# Patient Record
Sex: FEMALE | Race: WHITE | Employment: FULL TIME | URBAN - METROPOLITAN AREA
[De-identification: names, ages, dates, MRNs, and addresses within clinical notes are randomized per-mention and may not be internally consistent; named-entity substitution may affect disease eponyms.]

---

## 2024-05-24 NOTE — PROGRESS NOTES
PATIENT REACHED   YES____NO_X___        PREOP INSTRUCTIONS LEFT ON VM NUMBER:       Date: 5/ 30/ 2024      Arrival Time: 1:15 PM      Procedure Time: 2:15 PM      Location: [address] [place] [address]. [place] [address]      Nothing to eat or drink after MIDNIGHT the evening before your procedure.    You will need a responsible adult 18 years or older to stay on site, and take you home after the procedure.    Please bring a complete list of medications and supplements you currently take, with dosing.    Wear loose comfortable clothes.    Please follow any and all instructions the office has given you regarding medications that need to be stopped prior to procedure.     Please verify with the office regarding blood thinners.    The goal of blood sugar is to be under >200, the day of the procedure. If it is elevated you may be cancelled.    ANY QUESTIONS CALL YOUR DOCTOR.ALSO,PLEASE READ THE INSTRUCTION PACKET FROM YOUR DR IF YOU RECEIVED ONE.      DR. GUARDADO' OFFICE: [phone]      Additional Information:      VISITOR POLICY(subject to change)    Current policy is 2 visitors per patient. No children under 18. Masks are optional.

## 2024-05-30 ENCOUNTER — APPOINTMENT (OUTPATIENT)
Dept: GENERAL RADIOLOGY | Age: 54
End: 2024-05-30
Attending: PHYSICAL MEDICINE & REHABILITATION
Payer: COMMERCIAL

## 2024-05-30 ENCOUNTER — HOSPITAL ENCOUNTER (OUTPATIENT)
Age: 54
Setting detail: OUTPATIENT SURGERY
Discharge: HOME | End: 2024-05-30
Attending: PHYSICAL MEDICINE & REHABILITATION | Admitting: PHYSICAL MEDICINE & REHABILITATION
Payer: COMMERCIAL

## 2024-05-30 VITALS
OXYGEN SATURATION: 100 % | HEART RATE: 83 BPM | TEMPERATURE: 97.7 F | DIASTOLIC BLOOD PRESSURE: 83 MMHG | WEIGHT: 145 LBS | HEIGHT: 64 IN | SYSTOLIC BLOOD PRESSURE: 136 MMHG | BODY MASS INDEX: 24.75 KG/M2 | RESPIRATION RATE: 14 BRPM

## 2024-05-30 PROCEDURE — 2709999900 HC NON-CHARGEABLE SUPPLY: Performed by: PHYSICAL MEDICINE & REHABILITATION

## 2024-05-30 PROCEDURE — 6360000002 HC RX W HCPCS: Performed by: PHYSICAL MEDICINE & REHABILITATION

## 2024-05-30 PROCEDURE — 77003 FLUOROGUIDE FOR SPINE INJECT: CPT

## 2024-05-30 PROCEDURE — 3610000055 HC PAIN LEVEL 3 ADDL 15 MIN (NON-OR): Performed by: PHYSICAL MEDICINE & REHABILITATION

## 2024-05-30 PROCEDURE — 3610000054 HC PAIN LEVEL 3 BASE (NON-OR): Performed by: PHYSICAL MEDICINE & REHABILITATION

## 2024-05-30 PROCEDURE — 2500000003 HC RX 250 WO HCPCS: Performed by: PHYSICAL MEDICINE & REHABILITATION

## 2024-05-30 RX ORDER — LEVOTHYROXINE SODIUM 88 UG/1
88 TABLET ORAL DAILY
COMMUNITY
Start: 2024-03-06

## 2024-05-30 RX ORDER — LIDOCAINE HYDROCHLORIDE 10 MG/ML
INJECTION, SOLUTION EPIDURAL; INFILTRATION; INTRACAUDAL; PERINEURAL
Status: COMPLETED | OUTPATIENT
Start: 2024-05-30 | End: 2024-05-30

## 2024-05-30 RX ORDER — SERTRALINE HYDROCHLORIDE 100 MG/1
150 TABLET, FILM COATED ORAL DAILY
COMMUNITY
Start: 2010-07-21 | End: 2024-09-02

## 2024-05-30 RX ORDER — DEXAMETHASONE SODIUM PHOSPHATE 10 MG/ML
INJECTION INTRAMUSCULAR; INTRAVENOUS
Status: COMPLETED | OUTPATIENT
Start: 2024-05-30 | End: 2024-05-30

## 2024-05-30 RX ORDER — CLONAZEPAM 0.5 MG/1
TABLET ORAL
COMMUNITY
Start: 2010-07-20

## 2024-05-30 ASSESSMENT — PAIN DESCRIPTION - DESCRIPTORS
DESCRIPTORS: PRESSURE
DESCRIPTORS: ACHING;SHARP

## 2024-05-30 ASSESSMENT — PAIN SCALES - GENERAL
PAINLEVEL_OUTOF10: 6
PAINLEVEL_OUTOF10: 5

## 2024-05-30 ASSESSMENT — PAIN DESCRIPTION - PAIN TYPE: TYPE: CHRONIC PAIN

## 2024-05-30 ASSESSMENT — PAIN DESCRIPTION - ORIENTATION
ORIENTATION: RIGHT
ORIENTATION: MID;RIGHT

## 2024-05-30 ASSESSMENT — PAIN DESCRIPTION - LOCATION
LOCATION: NECK;SHOULDER;ARM
LOCATION: NECK

## 2024-05-30 NOTE — H&P
HISTORY AND PHYSICAL/PRE-SEDATION ASSESSMENT    Patient:  Talita Rodriguez   :  1970  Medical Record No.:  [id number]   Date:  2024  Physician:  Jr Morrow MD  Facility: [facility]    HISTORY OF PRESENT ILLNESS:                 The patient is a 53 y.o. female whom presents with arm pain. Review of the imaging and physical exam of the patient confirmed the pre-procedure diagnosis.  After a thorough discussion of risks, benefits and alternatives informed consent was obtained.    Diagnosis:  Pre-Op Diagnosis Codes:     * Cervical radiculopathy [M54.12]    Past Medical History:   No past medical history on file.     Past Surgical History:     No past surgical history on file.    Current Medications:   Prior to Admission medications    Not on File       Allergies:  Erythromycin, Iodine, and Sulfa antibiotics    Social History:        Family History:   No family history on file.     Vitals: There were no vitals taken for this visit.    PHYSICAL EXAM:  HENT: Airway patent and reviewed  Cardiovascular: Normal rate, regular rhythm, normal heart sounds.   Pulmonary/Chest: No wheezes. No rhonchi. No rales.   Abdominal: Soft. Bowel sounds are normal. No distension.  Extremities: Moves all extremities equally  Lumbar Spine: Painful range of motion, no midline tenderness     ASA CLASS:         []   I. Normal, healthy adult           [x]   II.  Mild systemic disease            []   III.  Severe systemic disease    Mallampati: Mallampati Class II - (soft palate, fauces & uvula are visible)      Sedation plan:   []  Local              [x]  Minimal                  []  General anesthesia    Treatment plan:  Patient's condition acceptable for planned procedure/sedation.  Proceed with planned procedure   Post Procedure Plan   Return to same level of care   ______________________     The risks and benefits as well as alternatives to the procedure have been discussed with the patient and or

## 2024-05-30 NOTE — PROGRESS NOTES
IV discontinued, catheter intact, and dressing applied.    Procedural dressing dry and intact.    Bilateral upper, lower extremities equal in strength.    Discharge instructions reviewed with patient or responsible adult, signed and copy given.  All home medications have been reviewed.  All questions answered and patient or responsible adult verbalized understanding.

## 2024-05-30 NOTE — OP NOTE
PATIENT:  Talita Rodriguez  AGE:  53 yrs  MEDICAL RECORD #:  [id number]  YOB: 1970     DATE:  5/30/2024  PHYSICIAN: Jr Morrow M.D.     PROCEDURE: Right C6 transforaminal epidural steroid injection under fluoroscopy.     PRE-OP DIAGNOSIS:  Neck Pain/Radiculopathy     POST-OP DIAGNOSIS:  same     HISTORY OF PRESENT ILLNESS:  See office notes. Patient has failed previous less-invasive treatments.     ALLERGIES:  Erythromycin, Iodine, and Sulfa antibiotics     MEDICATIONS:    No current facility-administered medications for this encounter.        PHYSICAL EXAMINATION:              General:  Awake, alert              Heart:  No audible murmurs, extremities well perfused              Lungs:  No increased WOB or audible wheezing              Extremities:  Normal tone. Warm. No swelling.      Anesthesia: 0 mg Versed and 0 mcg fentanyl    Estimated blood loss: None  Complications: None  Specimen: None    DESCRIPTION OF PROCEDURE:     Components of the procedure were again reviewed with the patient prior to the procedure.  She is aware of risks including infection, bleeding, allergic reaction, and nerve injury.  She had ample opportunity for additional questions.  She elected to proceed with treatment.     The patient was placed in the supine position.  Cardiovascular monitoring was initiated, and vital signs were stable prior to, during, and after the procedure.  Utilizing fluoroscopy, the C6 vertebrae was identified.  The area was sterilely prepped and draped. Skin anesthesia was achieved at each entry site using 1-2 cc of Lidocaine 1%. A 25 g 2.0 inch spinal needle was slowly inserted into the right C6 neuroforamen using AP, lateral and oblique fluoroscopic imaging. Negative aspiration was confirmed. 1 cc PROHANCE was injected at each site showing contrast spread into the epidural space and along the nerve root without vascular uptake. One ml of 10 mg dexamethasone was slowly injected at each site. The

## 2024-06-19 NOTE — PROGRESS NOTES
PATIENT REACHED   YES____NO____    PREOP INSTRUCTIONS LEFT ON VM NUMBER___UNABLE TO LEAVE MESSAGE____________      SWDF___[id number]______ TIME__1000_______ARRIVAL__0900______PLACE__[address] [facility]__________  NOTHING TO EAT OR DRINK  AFTER MIDNIGHT THE EVENING PRIOR OR AS INSTRUCTED BY YOUR DR.  YOU NEED A RESPONSIBLE ADULT AGE 18 OR OLDER TO DRIVE YOU HOME  PLEASE BRING INSURANCE CARD.PICTURE ID AND COMPLETE LIST OF MEDS  WEAR LOOSE COMFORTABLE CLOTHING  FOLLOW ANY INSTRUCTIONS YOUR DRS OFFICE HAS GIVEN YOU,INCLUDING WHAT MEDICATIONS TO TAKE THE AM OF PROCEDURE AND WHEN AND IF YOU NEED TO STOP ANY BLOOD THINNERS. IF YOU HAVE QUESTIONS REGARDING THIS CALL THE OFFICE  THE GOAL BLOOD SUGAR THE AM OF PROCEDURE  OR LESS ABOVE THAT THE PROCEDURE MAY BE CANCELLED  ANY QUESTIONS CALL YOUR DOCTOR.ALSO,PLEASE READ THE INSTRUCTION PACKET FROM YOUR DR IF YOU RECEIVED ONE.  SPINE INTERVENTION NUMBER [phone]      OTHER___________________________________      VISITOR POLICY(subject to change)    Current policy is 2 visitors per patient. No children. Masks are required.

## 2024-06-27 ENCOUNTER — HOSPITAL ENCOUNTER (OUTPATIENT)
Age: 54
Setting detail: OUTPATIENT SURGERY
Discharge: HOME | End: 2024-06-27
Attending: PHYSICAL MEDICINE & REHABILITATION | Admitting: PHYSICAL MEDICINE & REHABILITATION
Payer: COMMERCIAL

## 2024-06-27 ENCOUNTER — APPOINTMENT (OUTPATIENT)
Dept: GENERAL RADIOLOGY | Age: 54
End: 2024-06-27
Attending: PHYSICAL MEDICINE & REHABILITATION
Payer: COMMERCIAL

## 2024-06-27 VITALS
DIASTOLIC BLOOD PRESSURE: 87 MMHG | TEMPERATURE: 97.7 F | OXYGEN SATURATION: 100 % | HEIGHT: 64 IN | RESPIRATION RATE: 15 BRPM | HEART RATE: 60 BPM | BODY MASS INDEX: 25.27 KG/M2 | SYSTOLIC BLOOD PRESSURE: 129 MMHG | WEIGHT: 148 LBS

## 2024-06-27 PROCEDURE — 6360000002 HC RX W HCPCS: Performed by: PHYSICAL MEDICINE & REHABILITATION

## 2024-06-27 PROCEDURE — 77003 FLUOROGUIDE FOR SPINE INJECT: CPT

## 2024-06-27 PROCEDURE — 3610000058 HC PAIN LEVEL 5 BASE (NON-OR): Performed by: PHYSICAL MEDICINE & REHABILITATION

## 2024-06-27 PROCEDURE — 2500000003 HC RX 250 WO HCPCS: Performed by: PHYSICAL MEDICINE & REHABILITATION

## 2024-06-27 PROCEDURE — 2709999900 HC NON-CHARGEABLE SUPPLY: Performed by: PHYSICAL MEDICINE & REHABILITATION

## 2024-06-27 RX ORDER — BUPIVACAINE HYDROCHLORIDE 5 MG/ML
INJECTION, SOLUTION EPIDURAL; INTRACAUDAL
Status: COMPLETED | OUTPATIENT
Start: 2024-06-27 | End: 2024-06-27

## 2024-06-27 RX ORDER — LIDOCAINE HYDROCHLORIDE 10 MG/ML
INJECTION, SOLUTION EPIDURAL; INFILTRATION; INTRACAUDAL; PERINEURAL
Status: COMPLETED | OUTPATIENT
Start: 2024-06-27 | End: 2024-06-27

## 2024-06-27 ASSESSMENT — PAIN - FUNCTIONAL ASSESSMENT
PAIN_FUNCTIONAL_ASSESSMENT: 0-10
PAIN_FUNCTIONAL_ASSESSMENT: 0-10

## 2024-06-27 NOTE — OP NOTE
PATIENT:  Talita Rodriguez  AGE:  53 yrs  MEDICAL RECORD #:  [id number]  YOB: 1970     DATE:  6/27/2024  PHYSICIAN: Jr Morrow M.D.     PROCEDURE: Bilateral L3, L4, L5 diagnostic medial branch blocks under fluoroscopy.     PRE-OP DIAGNOSIS:  Low Back Pain/ Lumbar Spondylosis     POST-OP DIAGNOSIS:  same     HISTORY OF PRESENT ILLNESS:  See office notes. Patient has failed previous less-invasive treatments.     ALLERGIES:  Erythromycin, Iodine, and Sulfa antibiotics     MEDICATIONS:    No current facility-administered medications for this encounter.        PHYSICAL EXAMINATION:              General:  Awake, alert              Heart:  No audible murmurs, extremities well perfused              Lungs:  No increased WOB or audible wheezing              Extremities:  Normal tone.  No swelling.      Anesthesia: None    Estimated blood loss: None  Complications: None  Specimen: None    DESCRIPTION OF PROCEDURE:     Components of the procedure were again reviewed with the patient prior to the procedure.  She is aware of risks including infection, bleeding, allergic reaction, and nerve injury.  She had ample opportunity for additional questions.  She elected to proceed with treatment.     The patient was placed in the prone position.  Utilizing fluoroscopy, the L3, L4, and L5 areas were identified, target points for the diagnostic medial branch blocks were at the roots of the transverse processes and the superior articular processes with the exception of the L5 dorsal ramus at the sacral ala.  Appropriate entry sites were selected over the skin.  The skin was prepared in sterile fashion.  Local anesthesia was carried out at each of the 4 entry sites with 1-2 ml lidocaine 1%.     Utilizing a 22 gauge needle x 3.5 inch under fluoroscopy, each of the  6  target sites were approached.  AP and oblique viewpoints were utilized to ensure appropriate needle localization. Each of the 6 diagnostic blocks were carried

## 2024-06-27 NOTE — PROGRESS NOTES
Procedural dressing dry and intact.    Bilateral lower extremities equal in strength.    Discharge instructions reviewed with patient , signed and copy given.  All home medications have been reviewed.  All questions answered and patient  verbalized understanding.    CARLOS EDUARDO Cervantes,    Call from [facility] asking for updated blood pressure for patient? Last noted was 140/90 per caller. Or if any medication changes have been made.   She is asking for a callback at   [phone]    Thanks, Bismark Alexander

## 2024-06-27 NOTE — H&P
HISTORY AND PHYSICAL/PRE-SEDATION ASSESSMENT    Patient:  Talita Rodriguez   :  1970  Medical Record No.:  [id number]   Date:  2024  Physician:  Jr Morrow MD  Facility: [facility]    HISTORY OF PRESENT ILLNESS:                 The patient is a 53 y.o. female whom presents with low back pain. Review of the imaging and physical exam of the patient confirmed the pre-procedure diagnosis.  After a thorough discussion of risks, benefits and alternatives informed consent was obtained.    Diagnosis:  Pre-Op Diagnosis Codes:     * Lumbar spondylosis [M47.816]    Past Medical History:   Past Medical History:   Diagnosis Date    Thyroid disease         Past Surgical History:     Past Surgical History:   Procedure Laterality Date    BREAST SURGERY Right      SECTION      ELBOW SURGERY Left     Torn elbow tendon surgery    PAIN MANAGEMENT PROCEDURE Right 2024    RIGHT C5-6 TRANSFORAMINAL EPIDURAL STEROID INJECTION WITH FLUOROSCOPY [facility] performed by Jr Morrow MD at [facility]       Current Medications:   Prior to Admission medications    Medication Sig Start Date End Date Taking? Authorizing Provider   levothyroxine (SYNTHROID) 88 MCG tablet Take 1 tablet by mouth daily 3/6/24   Bhavin Mace MD   sertraline (ZOLOFT) 100 MG tablet Take 1.5 tablets by mouth daily 7/21/10 9/2/24  Bhavin Mace MD   clonazePAM (KLONOPIN) 0.5 MG tablet CLONAZEPAM 0.5 MG TABS 7/20/10   Bhavin Mace MD       Allergies:  Erythromycin, Iodine, and Sulfa antibiotics    Social History:    reports that she has never smoked. She does not have any smokeless tobacco history on file. She reports that she does not drink alcohol and does not use drugs.    Family History:   Family History   Problem Relation Age of Onset    No Known Problems Mother     No Known Problems Father         Vitals: not currently breastfeeding.    PHYSICAL EXAM:  HENT: Airway patent and

## 2024-07-26 NOTE — PROGRESS NOTES
PATIENT REACHED   YES____NO____        PREOP INSTRUCTIONS LEFT ON VM NUMBER:       Date: 8/ 1 /2024      Arrival Time: 11:00 AM      Procedure Time: 12:00 PM      Location: [address] [place] [address]. [place] [address]      Nothing to eat or drink 6 Hours prior to arrival.    You will need a responsible adult 18 years or older, to stay on site and take you home after the procedure.   (PLEASE HAVE  ACCOMPANY YOU INSIDE FOR REGISTRATION)    Please bring a complete list of medications and supplements you currently take, with name and dosing.    Wear loose comfortable clothes.    Please follow any and all instructions the office has given you regarding medications that need to be stopped prior to procedure.     Please verify with the office regarding blood thinners.    The goal of blood sugar is to be under >200, the day of the procedure. If it is elevated you may be cancelled.    ANY QUESTIONS CALL YOUR DOCTOR.ALSO,PLEASE READ THE INSTRUCTION PACKET FROM YOUR DR IF YOU RECEIVED ONE.      DR. GUARDADO' OFFICE: [phone]      Additional Information:      VISITOR POLICY(subject to change)    Current policy is 2 visitors per patient. No children under 18. Masks are optional.

## 2024-08-01 ENCOUNTER — APPOINTMENT (OUTPATIENT)
Dept: GENERAL RADIOLOGY | Age: 54
End: 2024-08-01
Attending: PHYSICAL MEDICINE & REHABILITATION
Payer: COMMERCIAL

## 2024-08-01 ENCOUNTER — HOSPITAL ENCOUNTER (OUTPATIENT)
Age: 54
Setting detail: OUTPATIENT SURGERY
Discharge: HOME | End: 2024-08-01
Attending: PHYSICAL MEDICINE & REHABILITATION | Admitting: PHYSICAL MEDICINE & REHABILITATION
Payer: COMMERCIAL

## 2024-08-01 VITALS
DIASTOLIC BLOOD PRESSURE: 84 MMHG | WEIGHT: 147 LBS | SYSTOLIC BLOOD PRESSURE: 137 MMHG | OXYGEN SATURATION: 100 % | TEMPERATURE: 98.2 F | RESPIRATION RATE: 16 BRPM | BODY MASS INDEX: 25.1 KG/M2 | HEART RATE: 63 BPM | HEIGHT: 64 IN

## 2024-08-01 PROCEDURE — 6360000002 HC RX W HCPCS: Performed by: PHYSICAL MEDICINE & REHABILITATION

## 2024-08-01 PROCEDURE — 77003 FLUOROGUIDE FOR SPINE INJECT: CPT

## 2024-08-01 PROCEDURE — 2500000003 HC RX 250 WO HCPCS: Performed by: PHYSICAL MEDICINE & REHABILITATION

## 2024-08-01 PROCEDURE — 2709999900 HC NON-CHARGEABLE SUPPLY: Performed by: PHYSICAL MEDICINE & REHABILITATION

## 2024-08-01 PROCEDURE — 3610000058 HC PAIN LEVEL 5 BASE (NON-OR): Performed by: PHYSICAL MEDICINE & REHABILITATION

## 2024-08-01 RX ORDER — BUPIVACAINE HYDROCHLORIDE 5 MG/ML
INJECTION, SOLUTION EPIDURAL; INTRACAUDAL
Status: COMPLETED | OUTPATIENT
Start: 2024-08-01 | End: 2024-08-01

## 2024-08-01 RX ORDER — IBUPROFEN 200 MG
200 TABLET ORAL EVERY 6 HOURS PRN
COMMUNITY

## 2024-08-01 RX ORDER — LIDOCAINE HYDROCHLORIDE 10 MG/ML
INJECTION, SOLUTION EPIDURAL; INFILTRATION; INTRACAUDAL; PERINEURAL
Status: COMPLETED | OUTPATIENT
Start: 2024-08-01 | End: 2024-08-01

## 2024-08-01 ASSESSMENT — PAIN - FUNCTIONAL ASSESSMENT
PAIN_FUNCTIONAL_ASSESSMENT: ACTIVITIES ARE NOT PREVENTED
PAIN_FUNCTIONAL_ASSESSMENT: 0-10
PAIN_FUNCTIONAL_ASSESSMENT: 0-10
PAIN_FUNCTIONAL_ASSESSMENT: PREVENTS OR INTERFERES SOME ACTIVE ACTIVITIES AND ADLS

## 2024-08-01 ASSESSMENT — PAIN DESCRIPTION - DESCRIPTORS
DESCRIPTORS: ACHING
DESCRIPTORS: DISCOMFORT

## 2024-08-01 NOTE — OP NOTE
PATIENT:  Talita Rodriguez  AGE:  54 yrs  MEDICAL RECORD #:  [id number]  YOB: 1970     DATE:  8/1/2024  PHYSICIAN: Jr Morrow M.D.     PROCEDURE: Bilateral L3, L4, L5 diagnostic medial branch blocks under fluoroscopy.     PRE-OP DIAGNOSIS:  Low Back Pain/ Lumbar Spondylosis     POST-OP DIAGNOSIS:  same     HISTORY OF PRESENT ILLNESS:  See office notes. Patient has failed previous less-invasive treatments.     ALLERGIES:  Erythromycin, Iodine, and Sulfa antibiotics     MEDICATIONS:    No current facility-administered medications for this encounter.        PHYSICAL EXAMINATION:              General:  Awake, alert              Heart:  No audible murmurs, extremities well perfused              Lungs:  No increased WOB or audible wheezing              Extremities:  Normal tone.  No swelling.      Anesthesia: None    Estimated blood loss: None  Complications: None  Specimen: None    DESCRIPTION OF PROCEDURE:     Components of the procedure were again reviewed with the patient prior to the procedure.  She is aware of risks including infection, bleeding, allergic reaction, and nerve injury.  She had ample opportunity for additional questions.  She elected to proceed with treatment.     The patient was placed in the prone position.  Utilizing fluoroscopy, the L3, L4, and L5 areas were identified, target points for the diagnostic medial branch blocks were at the roots of the transverse processes and the superior articular processes with the exception of the L5 dorsal ramus at the sacral ala.  Appropriate entry sites were selected over the skin.  The skin was prepared in sterile fashion.  Local anesthesia was carried out at each of the 4 entry sites with 1-2 ml lidocaine 1%.     Utilizing a 22 gauge needle x 3.5 inch under fluoroscopy, each of the  6  target sites were approached.  AP and oblique viewpoints were utilized to ensure appropriate needle localization. Each of the 6 diagnostic blocks were carried

## 2024-08-01 NOTE — PROGRESS NOTES
Procedural dressing dry and intact.    Bilateral lower extremities equal in strength.    Discharge instructions reviewed with patient or responsible adult, signed and copy given.  All home medications have been reviewed.  All questions answered and patient or responsible adult verbalized understanding.

## 2024-08-01 NOTE — H&P
HISTORY AND PHYSICAL/PRE-SEDATION ASSESSMENT    Patient:  Talita Rodriguez   :  1970  Medical Record No.:  [id number]   Date:  2024  Physician:  Jr Morrow MD  Facility: [facility]    HISTORY OF PRESENT ILLNESS:                 The patient is a 54 y.o. female whom presents with low back pain. Review of the imaging and physical exam of the patient confirmed the pre-procedure diagnosis.  After a thorough discussion of risks, benefits and alternatives informed consent was obtained.    Diagnosis:  Pre-Op Diagnosis Codes:     * Lumbar spondylosis [M47.816]    Past Medical History:   Past Medical History:   Diagnosis Date    Thyroid disease         Past Surgical History:     Past Surgical History:   Procedure Laterality Date    BREAST SURGERY Right      SECTION      ELBOW SURGERY Left     Torn elbow tendon surgery    PAIN MANAGEMENT PROCEDURE Right 2024    RIGHT C5-6 TRANSFORAMINAL EPIDURAL STEROID INJECTION WITH FLUOROSCOPY - [facility] performed by Jr Morrow MD at [facility]    PAIN MANAGEMENT PROCEDURE Bilateral 2024    BILATERAL L3, L4, L5 MEDIAL BRANCH BLOCK WITH FLUOROSCOPY #1 - [facility] performed by Jr Morrow MD at [facility]       Current Medications:   Prior to Admission medications    Medication Sig Start Date End Date Taking? Authorizing Provider   ibuprofen (ADVIL;MOTRIN) 200 MG tablet Take 1 tablet by mouth every 6 hours as needed for Pain   Yes Provider, Historical, MD   TURMERIC PO Take 1 capsule by mouth daily    Bhavin Mace MD   levothyroxine (SYNTHROID) 88 MCG tablet Take 1 tablet by mouth daily 3/6/24   Bhavin Mace MD   sertraline (ZOLOFT) 100 MG tablet Take 1.5 tablets by mouth daily 7/21/10 9/2/24  Bhavin Mace MD   clonazePAM (KLONOPIN) 0.5 MG tablet CLONAZEPAM 0.5 MG TABS 7/20/10   Bhavin Mace MD       Allergies:  Erythromycin, Iodine, and Sulfa antibiotics    Social History:

## 2024-08-28 NOTE — PROGRESS NOTES
PATIENT REACHED   YES____NO_X___        PREOP INSTRUCTIONS LEFT ON VM NUMBER:       Date: 9/ 3/ 2024      Arrival Time: 7:00 am      Procedure Time: 8:00 am      Location: [address] [place] [address]. [place] [address]      Nothing to eat or drink 6 Hours prior to arrival.    You will need a responsible adult 18 years or older, to stay on site and take you home after the procedure.   (PLEASE HAVE  ACCOMPANY YOU INSIDE FOR REGISTRATION)    Please bring a complete list of medications and supplements you currently take, with name and dosing.    Wear loose comfortable clothes.    Please follow any and all instructions the office has given you regarding medications that need to be stopped prior to procedure.     Please verify with the office regarding blood thinners.    The goal of blood sugar is to be under >200, the day of the procedure. If it is elevated you may be cancelled.    ANY QUESTIONS CALL YOUR DOCTOR.ALSO,PLEASE READ THE INSTRUCTION PACKET FROM YOUR DR IF YOU RECEIVED ONE.      DR. GUARDADO' OFFICE: [phone]      Additional Information:      VISITOR POLICY(subject to change)    Current policy is 2 visitors per patient. No children under 18. Masks are optional.

## 2024-09-03 ENCOUNTER — APPOINTMENT (OUTPATIENT)
Dept: GENERAL RADIOLOGY | Age: 54
End: 2024-09-03
Attending: PHYSICAL MEDICINE & REHABILITATION
Payer: COMMERCIAL

## 2024-09-03 ENCOUNTER — HOSPITAL ENCOUNTER (OUTPATIENT)
Age: 54
Setting detail: OUTPATIENT SURGERY
Discharge: HOME | End: 2024-09-03
Attending: PHYSICAL MEDICINE & REHABILITATION | Admitting: PHYSICAL MEDICINE & REHABILITATION
Payer: COMMERCIAL

## 2024-09-03 VITALS
RESPIRATION RATE: 16 BRPM | HEIGHT: 64 IN | OXYGEN SATURATION: 100 % | DIASTOLIC BLOOD PRESSURE: 81 MMHG | SYSTOLIC BLOOD PRESSURE: 137 MMHG | HEART RATE: 86 BPM | TEMPERATURE: 97.2 F | BODY MASS INDEX: 25.61 KG/M2 | WEIGHT: 150 LBS

## 2024-09-03 PROCEDURE — 2500000003 HC RX 250 WO HCPCS: Performed by: PHYSICAL MEDICINE & REHABILITATION

## 2024-09-03 PROCEDURE — 77003 FLUOROGUIDE FOR SPINE INJECT: CPT

## 2024-09-03 PROCEDURE — 3610000059 HC PAIN LEVEL 5 ADDL 15 MIN (NON-OR): Performed by: PHYSICAL MEDICINE & REHABILITATION

## 2024-09-03 PROCEDURE — 2709999900 HC NON-CHARGEABLE SUPPLY: Performed by: PHYSICAL MEDICINE & REHABILITATION

## 2024-09-03 PROCEDURE — 6360000002 HC RX W HCPCS: Performed by: PHYSICAL MEDICINE & REHABILITATION

## 2024-09-03 PROCEDURE — 3610000058 HC PAIN LEVEL 5 BASE (NON-OR): Performed by: PHYSICAL MEDICINE & REHABILITATION

## 2024-09-03 RX ORDER — BUPIVACAINE HYDROCHLORIDE 5 MG/ML
INJECTION, SOLUTION EPIDURAL; INTRACAUDAL
Status: COMPLETED | OUTPATIENT
Start: 2024-09-03 | End: 2024-09-03

## 2024-09-03 RX ORDER — DEXAMETHASONE SODIUM PHOSPHATE 10 MG/ML
INJECTION INTRAMUSCULAR; INTRAVENOUS
Status: COMPLETED | OUTPATIENT
Start: 2024-09-03 | End: 2024-09-03

## 2024-09-03 RX ORDER — LIDOCAINE HYDROCHLORIDE 10 MG/ML
INJECTION, SOLUTION EPIDURAL; INFILTRATION; INTRACAUDAL; PERINEURAL
Status: COMPLETED | OUTPATIENT
Start: 2024-09-03 | End: 2024-09-03

## 2024-09-03 RX ORDER — LIDOCAINE HYDROCHLORIDE 20 MG/ML
INJECTION, SOLUTION INFILTRATION; PERINEURAL
Status: COMPLETED | OUTPATIENT
Start: 2024-09-03 | End: 2024-09-03

## 2024-09-03 ASSESSMENT — PAIN - FUNCTIONAL ASSESSMENT
PAIN_FUNCTIONAL_ASSESSMENT: 0-10
PAIN_FUNCTIONAL_ASSESSMENT: NONE - DENIES PAIN
PAIN_FUNCTIONAL_ASSESSMENT: PREVENTS OR INTERFERES SOME ACTIVE ACTIVITIES AND ADLS

## 2024-09-03 ASSESSMENT — PAIN DESCRIPTION - DESCRIPTORS: DESCRIPTORS: ACHING;DULL

## 2024-09-03 NOTE — PROGRESS NOTES
Procedural dressing dry and intact.    Bilateral lower extremities equal in strength.    Discharge instructions reviewed with patient or responsible adult, signed and copy given.  All home medications have been reviewed.  All questions answered and patient or responsible adult verbalized understanding.  PAIN LEVEL AT DISCHARGE 5.  Patient complaining of lower back pain at curbside at discharge.  Patient advised to ice back and to call Dr. Call's office if pain persists or worsens with use of ice.

## 2024-09-03 NOTE — H&P
HISTORY AND PHYSICAL/PRE-SEDATION ASSESSMENT    Patient:  Talita Rodriguez   :  1970  Medical Record No.:  [id number]   Date:  9/3/2024  Physician:  Jr Morrow MD  Facility: [facility]    HISTORY OF PRESENT ILLNESS:                 The patient is a 54 y.o. female whom presents with low back pain. Review of the imaging and physical exam of the patient confirmed the pre-procedure diagnosis.  After a thorough discussion of risks, benefits and alternatives informed consent was obtained.    Diagnosis:  Pre-Op Diagnosis Codes:      * Lumbar spondylosis [M47.816]     * Spondylosis of lumbosacral region, unspecified spinal osteoarthritis complication status [M47.817]    Past Medical History:   Past Medical History:   Diagnosis Date    Thyroid disease         Past Surgical History:     Past Surgical History:   Procedure Laterality Date    BREAST SURGERY Right      SECTION      ELBOW SURGERY Left 2014    Torn elbow tendon surgery    PAIN MANAGEMENT PROCEDURE Right 2024    RIGHT C5-6 TRANSFORAMINAL EPIDURAL STEROID INJECTION WITH FLUOROSCOPY - [facility] performed by Jr Morrow MD at [facility]    PAIN MANAGEMENT PROCEDURE Bilateral 2024    BILATERAL L3, L4, L5 MEDIAL BRANCH BLOCK WITH FLUOROSCOPY #1 - [facility] performed by Jr Morrow MD at [facility]    PAIN MANAGEMENT PROCEDURE Bilateral 2024    BILATERAL L3, L4, L5 MEDIAL BRANCH BLOCK WITH FLUOROSCOPY - [facility] performed by Jr Morrow MD at [facility]       Current Medications:   Prior to Admission medications    Medication Sig Start Date End Date Taking? Authorizing Provider   ibuprofen (ADVIL;MOTRIN) 200 MG tablet Take 1 tablet by mouth every 6 hours as needed for Pain   Yes Provider, MD Bhavin   levothyroxine (SYNTHROID) 88 MCG tablet Take 1 tablet by mouth daily 3/6/24  Yes Provider, MD Bhavin   sertraline (ZOLOFT) 100 MG tablet Take 1.5 tablets by mouth daily 7/21/10 9/3/24 Yes

## 2024-09-03 NOTE — OP NOTE
PATIENT:  Talita Rodriguez  AGE:  54 yrs  MEDICAL RECORD #:  [id number]  YOB: 1970     DATE:  9/3/2024  PHYSICIAN: Jr Morrow M.D.     PROCEDURE: Bilateral L3, L4, L5 radiofrequency ablation/neurotomy under fluoroscopy.     PRE-OP DIAGNOSIS:  Low Back Pain/ Lumbar Spondylosis     POST-OP DIAGNOSIS:  same     HISTORY OF PRESENT ILLNESS:  See office notes. Patient has failed previous less-invasive treatments. Appropriate response to previous medial branch blocks at the same anatomic locations.     ALLERGIES:  Erythromycin, Iodine, and Sulfa antibiotics     MEDICATIONS:    No current facility-administered medications for this encounter.        PHYSICAL EXAMINATION:              General:  Awake, alert              Heart:  No audible murmurs, extremities well perfused              Lungs:  No increased WOB or audible wheezing              Extremities:  Normal tone. Warm. No swelling.      Anesthesia: 0 mcg Versed 0 mg Fentanyl    Estimated blood loss: None  Complications: None  Specimen: None    DESCRIPTION OF PROCEDURE:     Components of the procedure were again reviewed with the patient prior to the procedure.  She is aware of risks including infection, bleeding, allergic reaction, and nerve injury.  She had ample opportunity for additional questions.  She elected to proceed with treatment.     The patient was placed in the prone position.  Utilizing fluoroscopy, the L3, L4, and L5 areas were identified, target points for the accompanying medial branch nerves were identified. Appropriate entry sites were selected over the skin.  The skin was prepared in an aseptic fashion.  Local anesthesia was carried out at each of the  6  entry sites with 1-2 ml lidocaine 1%.     Under fluoroscopic guidance (AP and oblique views) a curved 20 gauge 10 cm RFA spinal needle was advanced to the bilateral L3,4,5 medial branches (at junction of SAP and transverse process, L5 dorsal ramus at sacral ala).  The RFA probe was

## 2024-10-30 NOTE — PROGRESS NOTES
PATIENT REACHED   YES____NO__X__    PREOP INSTRUCTIONS LEFT ON  EFEER[facility]__[phone]_____________      DATE_11/7/24________ TIME__1430_______ARRIVAL__1330______PLACE_[address]___________  NOTHING TO EAT OR DRINK  AFTER MIDNIGHT THE EVENING PRIOR OR AS INSTRUCTED BY YOUR DR.  YOU NEED A RESPONSIBLE ADULT AGE 18 OR OLDER TO DRIVE YOU HOME  PLEASE BRING INSURANCE CARD.PICTURE ID AND COMPLETE LIST OF MEDS  WEAR LOOSE COMFORTABLE CLOTHING  FOLLOW ANY INSTRUCTIONS YOUR DRS OFFICE HAS GIVEN YOU,INCLUDING WHAT MEDICATIONS TO TAKE THE AM OF PROCEDURE AND WHEN AND IF YOU NEED TO STOP ANY BLOOD THINNERS. IF YOU HAVE QUESTIONS REGARDING THIS CALL THE OFFICE  THE GOAL BLOOD SUGAR THE AM OF PROCEDURE  OR LESS ABOVE THAT THE PROCEDURE MAY BE CANCELLED  ANY QUESTIONS CALL YOUR DOCTOR.ALSO,PLEASE READ THE INSTRUCTION PACKET FROM YOUR DR IF YOU RECEIVED ONE.  SPINE INTERVENTION NUMBER [phone]      OTHER___________________________________      VISITOR POLICY(subject to change)    Current policy is 2 visitors per patient. No children. Masks are required.

## 2024-11-07 ENCOUNTER — HOSPITAL ENCOUNTER (OUTPATIENT)
Age: 54
Setting detail: OUTPATIENT SURGERY
Discharge: HOME | End: 2024-11-07
Attending: PHYSICAL MEDICINE & REHABILITATION | Admitting: PHYSICAL MEDICINE & REHABILITATION
Payer: COMMERCIAL

## 2024-11-07 ENCOUNTER — APPOINTMENT (OUTPATIENT)
Dept: GENERAL RADIOLOGY | Age: 54
End: 2024-11-07
Attending: PHYSICAL MEDICINE & REHABILITATION
Payer: COMMERCIAL

## 2024-11-07 VITALS
SYSTOLIC BLOOD PRESSURE: 127 MMHG | TEMPERATURE: 98.7 F | WEIGHT: 150 LBS | HEART RATE: 73 BPM | RESPIRATION RATE: 16 BRPM | HEIGHT: 64 IN | OXYGEN SATURATION: 100 % | BODY MASS INDEX: 25.61 KG/M2 | DIASTOLIC BLOOD PRESSURE: 63 MMHG

## 2024-11-07 PROCEDURE — 77003 FLUOROGUIDE FOR SPINE INJECT: CPT

## 2024-11-07 PROCEDURE — 3610000054 HC PAIN LEVEL 3 BASE (NON-OR): Performed by: PHYSICAL MEDICINE & REHABILITATION

## 2024-11-07 PROCEDURE — 2709999900 HC NON-CHARGEABLE SUPPLY: Performed by: PHYSICAL MEDICINE & REHABILITATION

## 2024-11-07 PROCEDURE — 6360000002 HC RX W HCPCS: Performed by: PHYSICAL MEDICINE & REHABILITATION

## 2024-11-07 RX ORDER — DEXAMETHASONE SODIUM PHOSPHATE 10 MG/ML
INJECTION INTRAMUSCULAR; INTRAVENOUS
Status: COMPLETED | OUTPATIENT
Start: 2024-11-07 | End: 2024-11-07

## 2024-11-07 ASSESSMENT — PAIN - FUNCTIONAL ASSESSMENT
PAIN_FUNCTIONAL_ASSESSMENT: 0-10
PAIN_FUNCTIONAL_ASSESSMENT: 0-10

## 2024-11-07 NOTE — H&P
HISTORY AND PHYSICAL/PRE-SEDATION ASSESSMENT    Patient:  Talita Rodriguez   :  1970  Medical Record No.:  [id number]   Date:  2024  Physician:  Jr Morrow MD  Facility: [facility]    HISTORY OF PRESENT ILLNESS:                 The patient is a 54 y.o. female whom presents with arm pain. Review of the imaging and physical exam of the patient confirmed the pre-procedure diagnosis.  After a thorough discussion of risks, benefits and alternatives informed consent was obtained.    Diagnosis:  Pre-Op Diagnosis Codes:      * Cervical radiculopathy [M54.12]    Past Medical History:   Past Medical History:   Diagnosis Date    Thyroid disease         Past Surgical History:     Past Surgical History:   Procedure Laterality Date    BREAST SURGERY Right      SECTION      ELBOW SURGERY Left     Torn elbow tendon surgery    PAIN MANAGEMENT PROCEDURE Right 2024    RIGHT C5-6 TRANSFORAMINAL EPIDURAL STEROID INJECTION WITH FLUOROSCOPY - MARIA performed by Jr Morrow MD at [facility]    PAIN MANAGEMENT PROCEDURE Bilateral 2024    BILATERAL L3, L4, L5 MEDIAL BRANCH BLOCK WITH FLUOROSCOPY #1 - MARIA performed by Jr Morrow MD at [facility]    PAIN MANAGEMENT PROCEDURE Bilateral 2024    BILATERAL L3, L4, L5 MEDIAL BRANCH BLOCK WITH FLUOROSCOPY - MARIA performed by Jr Morrow MD at [facility]    PAIN MANAGEMENT PROCEDURE Bilateral 9/3/2024    BILATERAL L3, L4, L5 (FACET LEVELS L4-5, L5-S1) RADIOFREQUENCY ABLATION WITH FLUOROSCOPY - MARIA performed by Jr Morrow MD at [facility]       Current Medications:   Prior to Admission medications    Medication Sig Start Date End Date Taking? Authorizing Provider   levothyroxine (SYNTHROID) 88 MCG tablet Take 1 tablet by mouth daily 3/6/24  Yes Bhavin Mace MD   sertraline (ZOLOFT) 100 MG tablet Take 1.5 tablets by mouth daily 7/21/10 11/7/24 Yes Bhavin Mace MD   clonazePAM  (KLONOPIN) 0.5 MG tablet CLONAZEPAM 0.5 MG TABS 7/20/10  Yes Bhavin Mace MD   ibuprofen (ADVIL;MOTRIN) 200 MG tablet Take 1 tablet by mouth every 6 hours as needed for Pain    Bhavin Mace MD   TURMERIC PO Take 1 capsule by mouth daily  Patient not taking: Reported on 9/3/2024    ProviderBhavin MD       Allergies:  Erythromycin, Iodine, and Sulfa antibiotics    Social History:    reports that she has never smoked. She has never used smokeless tobacco. She reports that she does not drink alcohol and does not use drugs.    Family History:   Family History   Problem Relation Age of Onset    No Known Problems Mother     No Known Problems Father         Vitals: Blood pressure (!) 151/85, pulse (!) 105, temperature 98.7 °F (37.1 °C), temperature source Temporal, resp. rate 16, height 1.626 m (5' 4\"), weight 68 kg (150 lb), SpO2 100%, not currently breastfeeding.    PHYSICAL EXAM:  HENT: Airway patent and reviewed  Cardiovascular: Normal rate, regular rhythm, normal heart sounds.   Pulmonary/Chest: No wheezes. No rhonchi. No rales.   Abdominal: Soft. Bowel sounds are normal. No distension.  Extremities: Moves all extremities equally  Lumbar Spine: Painful range of motion, no midline tenderness     ASA CLASS:         []   I. Normal, healthy adult           [x]   II.  Mild systemic disease            []   III.  Severe systemic disease    Mallampati: Mallampati Class II - (soft palate, fauces & uvula are visible)      Sedation plan:   []  Local              [x]  Minimal                  []  General anesthesia    Treatment plan:  Patient's condition acceptable for planned procedure/sedation.  Proceed with planned procedure   Post Procedure Plan   Return to same level of care   ______________________     The risks and benefits as well as alternatives to the procedure have been discussed with the patient and or family.  The patient and/or next of kin understands and agrees to proceed.    Jr RANGEL

## 2024-11-07 NOTE — PROGRESS NOTES
Procedural dressing dry and intact.    Bilateral upper extremities equal in strength.    Discharge instructions reviewed with patient or responsible adult, signed and copy given.  All home medications have been reviewed.  All questions answered and patient or responsible adult verbalized understanding.

## 2024-11-07 NOTE — OP NOTE
PATIENT:  Talita Rodriguez  AGE:  54 yrs  MEDICAL RECORD #:  [id number]  YOB: 1970     DATE:  11/7/2024  PHYSICIAN: Jr Morrow M.D.     PROCEDURE: Right C6 transforaminal epidural steroid injection under fluoroscopy.     PRE-OP DIAGNOSIS:  Neck Pain/Radiculopathy     POST-OP DIAGNOSIS:  same     HISTORY OF PRESENT ILLNESS:  See office notes. Patient has failed previous less-invasive treatments.     ALLERGIES:  Erythromycin, Iodine, and Sulfa antibiotics     MEDICATIONS:    No current facility-administered medications for this encounter.        PHYSICAL EXAMINATION:              General:  Awake, alert              Heart:  No audible murmurs, extremities well perfused              Lungs:  No increased WOB or audible wheezing              Extremities:  Normal tone. Warm. No swelling.      Anesthesia: 0 mg Versed and 0 mcg fentanyl    Estimated blood loss: None  Complications: None  Specimen: None    DESCRIPTION OF PROCEDURE:     Components of the procedure were again reviewed with the patient prior to the procedure.  She is aware of risks including infection, bleeding, allergic reaction, and nerve injury.  She had ample opportunity for additional questions.  She elected to proceed with treatment.     The patient was placed in the supine position.  Cardiovascular monitoring was initiated, and vital signs were stable prior to, during, and after the procedure.  Utilizing fluoroscopy, the C6 vertebrae was identified.  The area was sterilely prepped and draped. Skin anesthesia was achieved at each entry site using 1-2 cc of Lidocaine 1%. A 25 g 2.0 inch spinal needle was slowly inserted into the Right C6 neuroforamen using AP, lateral and oblique fluoroscopic imaging. Negative aspiration was confirmed. 1 cc PROHANCE was injected at each site showing contrast spread into the epidural space and along the nerve root without vascular uptake. One ml of 10 mg dexamethasone was slowly injected at each site. The  needles were removed after the stylets were repositioned. A sterile bandage was applied.  The patient was brought to recovery in stable condition.The patient tolerated the procedure well.     DISPOSITION:  The patient was transported to recovery.  The patient was monitored for 15 to 20 minutes post-procedure.  Precautions were discussed and written instructions provided.    Comment: None

## 2024-11-07 NOTE — PROGRESS NOTES
_____RIGHT C6 TRANSFORAMINAL EPIDURAL STEROID INJECTION WITH FLUOROSCOPY - [place] - Right site nikita observed and reported at handoff

## 2025-01-10 NOTE — PROGRESS NOTES
PATIENT REACHED   YES____NO_X___        PREOP INSTRUCTIONS LEFT ON VM NUMBER:       Date: 1/ 14/ 2025      Arrival Time: 1:15 PM      Procedure Time: 2:15 PM      Location: [address] [place] [address]. [place] [address]       -Nothing to eat or drink 6 Hours prior to arrival.    -You will need a safe  that is 18 years or older, to stay here on site and take you home after the procedure.   (PLEASE HAVE  [employer] YOU INSIDE FOR REGISTRATION)    -Please bring a complete list of medications and supplements you currently take, with name and dosing.    -Wear loose comfortable clothes.    -Please follow any and all instructions the office has given you regarding medications that need to be stopped prior to procedure.     -Please verify with the office regarding blood thinners and if/when they need to be held.    -If you are diabetic, the goal blood sugar is to be under >200, the day of the procedure. If it is elevated you may be cancelled.    -Please bring Photo ID and Insurance Card on day of procedure.      *IF YOU HAVE ANY QUESTIONS OR NEED TO RESCHEDULE FOR ANY REASON PLEASE CALL THE OFFICE**      DR. GUARDADO' OFFICE: [phone]      Additional Information:      VISITOR POLICY(subject to change):    Current policy is 2 visitors per patient. No children under 18. Masks are optional.

## 2025-01-14 ENCOUNTER — HOSPITAL ENCOUNTER (OUTPATIENT)
Age: 55
Setting detail: OUTPATIENT SURGERY
Discharge: HOME | End: 2025-01-14
Attending: PHYSICAL MEDICINE & REHABILITATION | Admitting: PHYSICAL MEDICINE & REHABILITATION
Payer: COMMERCIAL

## 2025-01-14 ENCOUNTER — APPOINTMENT (OUTPATIENT)
Dept: GENERAL RADIOLOGY | Age: 55
End: 2025-01-14
Attending: PHYSICAL MEDICINE & REHABILITATION
Payer: COMMERCIAL

## 2025-01-14 VITALS
DIASTOLIC BLOOD PRESSURE: 85 MMHG | SYSTOLIC BLOOD PRESSURE: 145 MMHG | TEMPERATURE: 97.6 F | RESPIRATION RATE: 16 BRPM | OXYGEN SATURATION: 98 % | BODY MASS INDEX: 24.75 KG/M2 | HEART RATE: 70 BPM | HEIGHT: 64 IN | WEIGHT: 145 LBS

## 2025-01-14 PROCEDURE — 77003 FLUOROGUIDE FOR SPINE INJECT: CPT

## 2025-01-14 PROCEDURE — 6360000002 HC RX W HCPCS: Performed by: PHYSICAL MEDICINE & REHABILITATION

## 2025-01-14 PROCEDURE — 3610000054 HC PAIN LEVEL 3 BASE (NON-OR): Performed by: PHYSICAL MEDICINE & REHABILITATION

## 2025-01-14 PROCEDURE — 2709999900 HC NON-CHARGEABLE SUPPLY: Performed by: PHYSICAL MEDICINE & REHABILITATION

## 2025-01-14 RX ORDER — SERTRALINE HYDROCHLORIDE 100 MG/1
100 TABLET, FILM COATED ORAL DAILY
COMMUNITY

## 2025-01-14 RX ORDER — LIDOCAINE HYDROCHLORIDE 10 MG/ML
INJECTION, SOLUTION EPIDURAL; INFILTRATION; INTRACAUDAL; PERINEURAL
Status: COMPLETED | OUTPATIENT
Start: 2025-01-14 | End: 2025-01-14

## 2025-01-14 ASSESSMENT — PAIN - FUNCTIONAL ASSESSMENT
PAIN_FUNCTIONAL_ASSESSMENT: ACTIVITIES ARE NOT PREVENTED
PAIN_FUNCTIONAL_ASSESSMENT: 0-10

## 2025-01-14 ASSESSMENT — PAIN DESCRIPTION - DESCRIPTORS: DESCRIPTORS: DISCOMFORT

## 2025-01-14 NOTE — H&P
HISTORY AND PHYSICAL/PRE-SEDATION ASSESSMENT    Patient:  Talita Rodriguez   :  1970  Medical Record No.:  [id number]   Date:  2025  Physician:  Jr Morrow MD  Facility: [facility]    HISTORY OF PRESENT ILLNESS:                 The patient is a 54 y.o. female whom presents with SI joint pain. Review of the imaging and physical exam of the patient confirmed the pre-procedure diagnosis.  After a thorough discussion of risks, benefits and alternatives informed consent was obtained.    Diagnosis:  Pre-Op Diagnosis Codes:      * Sacroiliac joint dysfunction [M53.3]    Past Medical History:   Past Medical History:   Diagnosis Date    Thyroid disease         Past Surgical History:     Past Surgical History:   Procedure Laterality Date    BACK INJECTION Right 2025    DIAGNOSTIC RIGHT SACROILIAC JOINT INJECTION WITH FLUOROSCOPY; LIDOCAINE ONLY; WITH MANEUVERS - MARIA performed by Jr Morrow MD at [facility]    BREAST SURGERY Right      SECTION      ELBOW SURGERY Left 2014    Torn elbow tendon surgery    PAIN MANAGEMENT PROCEDURE Right 2024    RIGHT C5-6 TRANSFORAMINAL EPIDURAL STEROID INJECTION WITH FLUOROSCOPY - MARIA performed by Jr Morrow MD at [facility]    PAIN MANAGEMENT PROCEDURE Bilateral 2024    BILATERAL L3, L4, L5 MEDIAL BRANCH BLOCK WITH FLUOROSCOPY #1 - MARIA performed by Jr Morrow MD at [facility]    PAIN MANAGEMENT PROCEDURE Bilateral 2024    BILATERAL L3, L4, L5 MEDIAL BRANCH BLOCK WITH FLUOROSCOPY - MARIA performed by Jr Morrow MD at [facility]    PAIN MANAGEMENT PROCEDURE Bilateral 9/3/2024    BILATERAL L3, L4, L5 (FACET LEVELS L4-5, L5-S1) RADIOFREQUENCY ABLATION WITH FLUOROSCOPY - MARIA performed by Jr Morrow MD at [facility]    PAIN MANAGEMENT PROCEDURE Right 2024    RIGHT C6 TRANSFORAMINAL EPIDURAL STEROID INJECTION WITH FLUOROSCOPY - MARIA performed by Jr Morrow MD at

## 2025-01-14 NOTE — PROGRESS NOTES
________DIAGNOSTIC RIGHT SACROILIAC JOINT INJECTION WITH FLUOROSCOPY; LIDOCAINE ONLY; WITH MANEUVERS - MARIA - Right site (marking) observed and reported at handoff

## 2025-01-14 NOTE — OP NOTE
into the joint to confirm placement.  A total of 1 cc Lidocaine 1% was slowly injected at each site. The needle(s) was/were removed after the stylets were repositioned. A sterile bandage was applied.  The patient was brought to recovery in stable condition.The patient tolerated the procedure well.     DISPOSITION:  The patient was transported to recovery.  The patient was monitored for 15 to 20 minutes post-procedure.  Precautions were discussed and written instructions provided.    Comment: See exam and pain scores above.

## 2025-06-18 NOTE — PROGRESS NOTES
CLINICAL STAFF DOCUMENTATION    Dr. Dyevi Blue  1959  [id number]    Have you had any Chest Pain recently? - No  Have you had any Shortness of Breath - No  Have you had any dizziness - No  Have you had any palpitations recently? - No  Do you have any edema - swelling in No      When did you have your last labs drawn 5/25  What doctor ordered Ahmed  Do we have the labs in their chart Yes  If we do not have the labs, ask where they were drawn     Do you need any prescriptions refilled? - No    Do you have a surgery or procedure scheduled in the near future - No    Do use tobacco products? - No  Do you drink alcohol? - No  Do you use any illicit drugs? - No  Caffeine? - Yes  How much caffeine? 2 bottles of tea daily     Check medication list thoroughly!!! AND RECONCILE OUTSIDE MEDICATIONS  If dose has changed change the entire order not just the MG  BE SURE TO ASK PATIENT IF THEY NEED MEDICATION REFILLS  Verify Pharmacy and update if incorrect   PATIENT REACHED   YES_X___NO____    PREOP INSTRUCTIONS LEFT ON VM NUMBER_______________      DATE_7/25/24________ TIME__0915_______ARRIVAL_0815_______PLACE_[id number]___________  NOTHING TO EAT OR DRINK  AFTER MIDNIGHT THE EVENING PRIOR OR AS INSTRUCTED BY YOUR DR.  YOU NEED A RESPONSIBLE ADULT AGE 18 OR OLDER TO DRIVE YOU HOME  PLEASE BRING INSURANCE CARD.PICTURE ID AND COMPLETE LIST OF MEDS  WEAR LOOSE COMFORTABLE CLOTHING  FOLLOW ANY INSTRUCTIONS YOUR DRS OFFICE HAS GIVEN YOU,INCLUDING WHAT MEDICATIONS TO TAKE THE AM OF PROCEDURE AND WHEN AND IF YOU NEED TO STOP ANY BLOOD THINNERS. IF YOU HAVE QUESTIONS REGARDING THIS CALL THE OFFICE  THE GOAL BLOOD SUGAR THE AM OF PROCEDURE  OR LESS ABOVE THAT THE PROCEDURE MAY BE CANCELLED  ANY QUESTIONS CALL YOUR DOCTOR.ALSO,PLEASE READ THE INSTRUCTION PACKET FROM YOUR DR IF YOU RECEIVED ONE.  SPINE INTERVENTION NUMBER [phone]      OTHER___________________________________      VISITOR POLICY(subject to change)    Current policy is 2 visitors per patient. No children. Masks are required.

## (undated) DEVICE — STANDARD HYPODERMIC NEEDLE,POLYPROPYLENE HUB: Brand: MONOJECT

## (undated) DEVICE — Device: Brand: JELCO

## (undated) DEVICE — SYRINGE, LUER LOCK, 10ML: Brand: MEDLINE

## (undated) DEVICE — NEEDLE SPNL 22GA L5IN BLK HUB S STL W/ QNCKE PNT W/OUT

## (undated) DEVICE — SHEET,DRAPE,53X77,STERILE: Brand: MEDLINE

## (undated) DEVICE — APPLICATOR MEDICATED 26 CC SOLUTION HI LT ORNG CHLORAPREP

## (undated) DEVICE — GLOVE ORANGE PI 8   MSG9080

## (undated) DEVICE — TRAY ANES SUPP NO DRUG CUST

## (undated) DEVICE — SYRINGE MED 3ML CLR PLAS LUERLOCK CONN VI ACCS INTLNK 15GA

## (undated) DEVICE — 3M™ TEGADERM™ +PAD FILM DRESSING WITH NON-ADHERENT PAD, 3588, 6 IN X 6 IN (15 CM X 15 CM), 25/CAR, 4 CAR/CS: Brand: 3M™ TEGADERM™

## (undated) DEVICE — STERILE POLYISOPRENE POWDER-FREE SURGICAL GLOVES: Brand: PROTEXIS

## (undated) DEVICE — TOWEL,OR,DSP,ST,BLUE,STD,4/PK,20PK/CS: Brand: MEDLINE

## (undated) DEVICE — NDL,TUOHY EPID,22GX3.5",METAL STYLET: Brand: MEDLINE

## (undated) DEVICE — MEDIA CONTRAST RX ISOVUE-300 61% 30ML VIALS

## (undated) DEVICE — NEEDLE SPNL 22GA L3.5IN BLK HUB S STL REG WALL FIT STYL

## (undated) DEVICE — AVANOS* EXTENSION SETS: Brand: EXTENSION SET, MINI BORE, 12" LENGTH, 0.50ML VOLUME 25

## (undated) DEVICE — PEN: MARKING STD 100/CS: Brand: MEDICAL ACTION INDUSTRIES

## (undated) DEVICE — NEEDLE SPNL 25GA L3.5IN BLU HUB S STL REG WALL FIT STYL W/

## (undated) DEVICE — 7496-8Z MINI-PLUS KIT: Brand: DEVON

## (undated) DEVICE — PORT VLV 2 W NDL FREE SMRTSITE

## (undated) DEVICE — GLOVE ORANGE PI 7   MSG9070

## (undated) DEVICE — PROBE NERVE STIM MONOPOLAR 10096] MEDTRONIC USA INC]

## (undated) DEVICE — PAD GRND NEUT ELECTRD AD DISP